# Patient Record
Sex: FEMALE | Race: WHITE | NOT HISPANIC OR LATINO | Employment: UNEMPLOYED | URBAN - METROPOLITAN AREA
[De-identification: names, ages, dates, MRNs, and addresses within clinical notes are randomized per-mention and may not be internally consistent; named-entity substitution may affect disease eponyms.]

---

## 2019-06-12 DIAGNOSIS — M79.672 LEFT FOOT PAIN: Primary | ICD-10-CM

## 2019-06-13 ENCOUNTER — OFFICE VISIT (OUTPATIENT)
Dept: ORTHOPEDICS | Facility: CLINIC | Age: 59
End: 2019-06-13
Payer: COMMERCIAL

## 2019-06-13 ENCOUNTER — HOSPITAL ENCOUNTER (OUTPATIENT)
Dept: RADIOLOGY | Facility: HOSPITAL | Age: 59
Discharge: HOME | End: 2019-06-13
Attending: ORTHOPAEDIC SURGERY
Payer: COMMERCIAL

## 2019-06-13 VITALS
HEIGHT: 66 IN | WEIGHT: 250 LBS | BODY MASS INDEX: 40.18 KG/M2 | HEART RATE: 70 BPM | DIASTOLIC BLOOD PRESSURE: 58 MMHG | SYSTOLIC BLOOD PRESSURE: 115 MMHG

## 2019-06-13 DIAGNOSIS — M76.62 ACHILLES TENDINITIS OF LEFT LOWER EXTREMITY: ICD-10-CM

## 2019-06-13 DIAGNOSIS — M92.62 HAGLUND'S DEFORMITY OF LEFT HEEL: Primary | ICD-10-CM

## 2019-06-13 DIAGNOSIS — M79.672 LEFT FOOT PAIN: ICD-10-CM

## 2019-06-13 PROCEDURE — 99203 OFFICE O/P NEW LOW 30 MIN: CPT | Mod: S$GLB,,, | Performed by: ORTHOPAEDIC SURGERY

## 2019-06-13 PROCEDURE — 97760 ORTHOTIC MGMT&TRAING 1ST ENC: CPT | Mod: S$GLB,,, | Performed by: ORTHOPAEDIC SURGERY

## 2019-06-13 PROCEDURE — 73630 XR FOOT COMPLETE 3 VIEW LEFT: ICD-10-PCS | Mod: 26,LT,, | Performed by: RADIOLOGY

## 2019-06-13 PROCEDURE — 99999 PR PBB SHADOW E&M-EST. PATIENT-LVL III: CPT | Mod: PBBFAC,,, | Performed by: ORTHOPAEDIC SURGERY

## 2019-06-13 PROCEDURE — 99203 PR OFFICE/OUTPT VISIT, NEW, LEVL III, 30-44 MIN: ICD-10-PCS | Mod: S$GLB,,, | Performed by: ORTHOPAEDIC SURGERY

## 2019-06-13 PROCEDURE — 99999 PR PBB SHADOW E&M-EST. PATIENT-LVL III: ICD-10-PCS | Mod: PBBFAC,,, | Performed by: ORTHOPAEDIC SURGERY

## 2019-06-13 PROCEDURE — 97760 PR ORTHOTIC MGMT&TRAINJ INITIAL ENC EA 15 MINS: ICD-10-PCS | Mod: S$GLB,,, | Performed by: ORTHOPAEDIC SURGERY

## 2019-06-13 PROCEDURE — 3008F BODY MASS INDEX DOCD: CPT | Mod: CPTII,S$GLB,, | Performed by: ORTHOPAEDIC SURGERY

## 2019-06-13 PROCEDURE — 3008F PR BODY MASS INDEX (BMI) DOCUMENTED: ICD-10-PCS | Mod: CPTII,S$GLB,, | Performed by: ORTHOPAEDIC SURGERY

## 2019-06-13 PROCEDURE — 73630 X-RAY EXAM OF FOOT: CPT | Mod: 26,LT,, | Performed by: RADIOLOGY

## 2019-06-13 PROCEDURE — 73630 X-RAY EXAM OF FOOT: CPT | Mod: TC,PO,LT

## 2019-06-13 RX ORDER — SPIRONOLACTONE 25 MG/1
1 TABLET ORAL DAILY
COMMUNITY
Start: 2019-04-24

## 2019-06-13 RX ORDER — ACETAMINOPHEN AND PHENYLEPHRINE HCL 325; 5 MG/1; MG/1
5000 TABLET ORAL DAILY
COMMUNITY
Start: 2019-02-06

## 2019-06-13 RX ORDER — LEVOTHYROXINE SODIUM 100 UG/1
1 TABLET ORAL DAILY
COMMUNITY
Start: 2019-04-18

## 2019-06-13 RX ORDER — VALACYCLOVIR HYDROCHLORIDE 1 G/1
1000 TABLET, FILM COATED ORAL 2 TIMES DAILY PRN
Refills: 3 | COMMUNITY
Start: 2019-05-08

## 2019-06-13 RX ORDER — FAMOTIDINE 20 MG/1
20 TABLET, FILM COATED ORAL 2 TIMES DAILY
COMMUNITY
Start: 2019-02-06 | End: 2020-02-06

## 2019-06-13 RX ORDER — FUROSEMIDE 20 MG/1
1 TABLET ORAL DAILY PRN
COMMUNITY
Start: 2019-04-15

## 2019-06-13 RX ORDER — METRONIDAZOLE 10 MG/G
1 GEL TOPICAL DAILY PRN
COMMUNITY

## 2019-06-13 NOTE — PROGRESS NOTES
"HPI: Raquel Parada is a 54 y.o. female who is for c/o left achilles pain. She had the same problem on the right that I treated her for about 5 years ago.   She rates her pain as 7/10 today. She is having issues with her kidneys and peripheral edema. The pain is worse with walking and standing.  No history of injury or trauma.         PAST MEDICAL/SURGICAL/FAMILY/SOCIAL/ HISTORY: REVIEWED    ALLERGIES/MEDICATIONS: REVIEWED      PHYSICAL EXAM:      Filed Vitals:     10/23/14 1326   BP: 126/60   Pulse: 56          Ht Readings from Last 1 Encounters:   10/23/14 5' 6" (1.676 m)          Wt Readings from Last 1 Encounters:   10/23/14 250 lb (113.399 kg)            GENERAL: Well developed, well nourished, no acute distress.  SKIN: Skin is intact. No atrophy, abrasions or lesions are noted.   Neurological: Normal mental status. Appropriate and conversant. Alert and oriented x 3.  GAIT: Walks with non-antalgic gait.     Right lower extremity:  2+ dorsalis pedis pulse.  Capillary refill < 3 seconds.  Normal range of motion tibiotalar and subtalar joint  HPI: Raquel Parada is a 54 y.o. female who is here for f/u on her right insertional achilles tendonitis.  She rates her pain as 0/10 today. She still has one small spot that is tender.  The PT and the boot helped a lot.        PAST MEDICAL/SURGICAL/FAMILY/SOCIAL/ HISTORY: REVIEWED    ALLERGIES/MEDICATIONS: REVIEWED      PHYSICAL EXAM:      Filed Vitals:     10/23/14 1326   BP: 126/60   Pulse: 56          Ht Readings from Last 1 Encounters:   10/23/14 5' 6" (1.676 m)          Wt Readings from Last 1 Encounters:   10/23/14 250 lb (113.399 kg)            GENERAL: Well developed, well nourished, no acute distress.  SKIN: Skin is intact. No atrophy, abrasions or lesions are noted.   Neurological: Normal mental status. Appropriate and conversant. Alert and oriented x 3.  GAIT: Walks with a mildly antalgic gait.     Left lower extremity compared with RLE:  2+ dorsalis pedis " pulse.  Capillary refill < 3 seconds.  Normal range of motion tibiotalar and subtalar joints. Mild pes planovalgus.   5/5 strength EHL, FHL, tibialis anterior, gastrocsoleus, tibialis posterior and peroneals. Sensation to light touch intact sural, saphenous, superficial peroneal and deep peroneal nerves. No swelling at the insertion of the achilles. No lymphadenopathy, no masses or tumors palpated.    tenderness to palpation at the insertion of the achilles tendon where she does not have a pump bump.  severe 2+ pitting edema bilateral lower extremities L>R.       XRAYS:   3 views of left ankle obtained and reviewed today reveal mild spurring at the insertion of the achilles tendon.  Mild Haylee's deformity.       ASSESSMENT:           Encounter Diagnoses   Name Primary?    Haylee's deformity of left heel Yes    Achilles tendinitis of left lower extremity          PLAN: She says she will do her home  PT exercises. CPT code 26572:  We performed a custom orthotic/brace adjustment, fitting and training with the patient today. The patient demonstrated understanding and proper care. This was performed for 15 minutes.  Short boot  was given.  We discussed that do to her kidney issues and severe peripheral edema I do not think any type of surgery for this is a good idea.  F/u prn.

## 2019-11-30 ENCOUNTER — CLINICAL SUPPORT (OUTPATIENT)
Dept: CARDIOLOGY | Facility: HOSPITAL | Age: 59
End: 2019-11-30
Attending: INTERNAL MEDICINE
Payer: COMMERCIAL

## 2019-11-30 ENCOUNTER — HOSPITAL ENCOUNTER (OUTPATIENT)
Facility: HOSPITAL | Age: 59
Discharge: HOME | End: 2019-12-01
Attending: EMERGENCY MEDICINE | Admitting: INTERNAL MEDICINE
Payer: COMMERCIAL

## 2019-11-30 DIAGNOSIS — R07.89 ATYPICAL CHEST PAIN: Primary | ICD-10-CM

## 2019-11-30 DIAGNOSIS — R07.9 CHEST PAIN: ICD-10-CM

## 2019-11-30 DIAGNOSIS — R10.13 EPIGASTRIC PAIN: ICD-10-CM

## 2019-11-30 PROBLEM — I10 HTN (HYPERTENSION): Status: ACTIVE | Noted: 2019-11-30

## 2019-11-30 PROBLEM — N05.0 MINIMAL CHANGE DISEASE: Status: ACTIVE | Noted: 2019-11-30

## 2019-11-30 PROBLEM — R01.1 CARDIAC MURMUR: Status: ACTIVE | Noted: 2019-11-30

## 2019-11-30 LAB
ALBUMIN SERPL BCP-MCNC: 3.4 G/DL (ref 3.5–5.2)
ALP SERPL-CCNC: 64 U/L (ref 55–135)
ALT SERPL W/O P-5'-P-CCNC: 20 U/L (ref 10–44)
ANION GAP SERPL CALC-SCNC: 9 MMOL/L (ref 8–16)
AST SERPL-CCNC: 18 U/L (ref 10–40)
BASOPHILS # BLD AUTO: 0.04 K/UL (ref 0–0.2)
BASOPHILS NFR BLD: 0.5 % (ref 0–1.9)
BILIRUB SERPL-MCNC: 0.9 MG/DL (ref 0.1–1)
BNP SERPL-MCNC: 14 PG/ML (ref 0–99)
BUN SERPL-MCNC: 15 MG/DL (ref 6–20)
CALCIUM SERPL-MCNC: 9.9 MG/DL (ref 8.7–10.5)
CHLORIDE SERPL-SCNC: 102 MMOL/L (ref 95–110)
CK MB SERPL-MCNC: 1.3 NG/ML (ref 0.1–6.5)
CO2 SERPL-SCNC: 27 MMOL/L (ref 23–29)
CREAT SERPL-MCNC: 0.8 MG/DL (ref 0.5–1.4)
D DIMER PPP IA.FEU-MCNC: 0.52 UG/ML FEU
DIFFERENTIAL METHOD: ABNORMAL
EOSINOPHIL # BLD AUTO: 0.1 K/UL (ref 0–0.5)
EOSINOPHIL NFR BLD: 1.7 % (ref 0–8)
ERYTHROCYTE [DISTWIDTH] IN BLOOD BY AUTOMATED COUNT: 12.6 % (ref 11.5–14.5)
EST. GFR  (AFRICAN AMERICAN): >60 ML/MIN/1.73 M^2
EST. GFR  (NON AFRICAN AMERICAN): >60 ML/MIN/1.73 M^2
GLUCOSE SERPL-MCNC: 119 MG/DL (ref 70–110)
HCT VFR BLD AUTO: 40.5 % (ref 37–48.5)
HGB BLD-MCNC: 13.4 G/DL (ref 12–16)
IMM GRANULOCYTES # BLD AUTO: 0.02 K/UL (ref 0–0.04)
IMM GRANULOCYTES NFR BLD AUTO: 0.2 % (ref 0–0.5)
LYMPHOCYTES # BLD AUTO: 1 K/UL (ref 1–4.8)
LYMPHOCYTES NFR BLD: 12.4 % (ref 18–48)
MCH RBC QN AUTO: 31.3 PG (ref 27–31)
MCHC RBC AUTO-ENTMCNC: 33.1 G/DL (ref 32–36)
MCV RBC AUTO: 95 FL (ref 82–98)
MONOCYTES # BLD AUTO: 0.6 K/UL (ref 0.3–1)
MONOCYTES NFR BLD: 7.1 % (ref 4–15)
NEUTROPHILS # BLD AUTO: 6.3 K/UL (ref 1.8–7.7)
NEUTROPHILS NFR BLD: 78.1 % (ref 38–73)
NRBC BLD-RTO: 0 /100 WBC
PLATELET # BLD AUTO: 148 K/UL (ref 150–350)
PMV BLD AUTO: 12.9 FL (ref 9.2–12.9)
POTASSIUM SERPL-SCNC: 4 MMOL/L (ref 3.5–5.1)
PROT SERPL-MCNC: 6.7 G/DL (ref 6–8.4)
RBC # BLD AUTO: 4.28 M/UL (ref 4–5.4)
SODIUM SERPL-SCNC: 138 MMOL/L (ref 136–145)
TROPONIN I SERPL DL<=0.01 NG/ML-MCNC: <0.03 NG/ML (ref 0.02–0.04)
WBC # BLD AUTO: 8.12 K/UL (ref 3.9–12.7)

## 2019-11-30 PROCEDURE — 96372 THER/PROPH/DIAG INJ SC/IM: CPT | Mod: 59

## 2019-11-30 PROCEDURE — G0378 HOSPITAL OBSERVATION PER HR: HCPCS

## 2019-11-30 PROCEDURE — 85379 FIBRIN DEGRADATION QUANT: CPT

## 2019-11-30 PROCEDURE — 94761 N-INVAS EAR/PLS OXIMETRY MLT: CPT

## 2019-11-30 PROCEDURE — 25000003 PHARM REV CODE 250: Performed by: EMERGENCY MEDICINE

## 2019-11-30 PROCEDURE — 99285 EMERGENCY DEPT VISIT HI MDM: CPT | Mod: 25

## 2019-11-30 PROCEDURE — 83880 ASSAY OF NATRIURETIC PEPTIDE: CPT

## 2019-11-30 PROCEDURE — 63600175 PHARM REV CODE 636 W HCPCS: Performed by: EMERGENCY MEDICINE

## 2019-11-30 PROCEDURE — 25000003 PHARM REV CODE 250: Performed by: INTERNAL MEDICINE

## 2019-11-30 PROCEDURE — 84484 ASSAY OF TROPONIN QUANT: CPT | Mod: 91

## 2019-11-30 PROCEDURE — 63600175 PHARM REV CODE 636 W HCPCS: Performed by: INTERNAL MEDICINE

## 2019-11-30 PROCEDURE — 25500020 PHARM REV CODE 255: Performed by: EMERGENCY MEDICINE

## 2019-11-30 PROCEDURE — 93005 ELECTROCARDIOGRAM TRACING: CPT

## 2019-11-30 PROCEDURE — 80053 COMPREHEN METABOLIC PANEL: CPT

## 2019-11-30 PROCEDURE — 85025 COMPLETE CBC W/AUTO DIFF WBC: CPT

## 2019-11-30 PROCEDURE — 36415 COLL VENOUS BLD VENIPUNCTURE: CPT

## 2019-11-30 PROCEDURE — 82553 CREATINE MB FRACTION: CPT

## 2019-11-30 PROCEDURE — 96375 TX/PRO/DX INJ NEW DRUG ADDON: CPT | Mod: 59

## 2019-11-30 PROCEDURE — 93306 TTE W/DOPPLER COMPLETE: CPT

## 2019-11-30 RX ORDER — MORPHINE SULFATE 4 MG/ML
4 INJECTION, SOLUTION INTRAMUSCULAR; INTRAVENOUS
Status: COMPLETED | OUTPATIENT
Start: 2019-11-30 | End: 2019-11-30

## 2019-11-30 RX ORDER — HYDROCODONE BITARTRATE AND ACETAMINOPHEN 5; 325 MG/1; MG/1
1 TABLET ORAL EVERY 6 HOURS PRN
Status: DISCONTINUED | OUTPATIENT
Start: 2019-11-30 | End: 2019-12-01 | Stop reason: HOSPADM

## 2019-11-30 RX ORDER — PANTOPRAZOLE SODIUM 40 MG/1
40 TABLET, DELAYED RELEASE ORAL DAILY
Status: DISCONTINUED | OUTPATIENT
Start: 2019-11-30 | End: 2019-12-01 | Stop reason: HOSPADM

## 2019-11-30 RX ORDER — TALC
9 POWDER (GRAM) TOPICAL NIGHTLY PRN
Status: DISCONTINUED | OUTPATIENT
Start: 2019-11-30 | End: 2019-12-01 | Stop reason: HOSPADM

## 2019-11-30 RX ORDER — CHOLECALCIFEROL (VITAMIN D3) 25 MCG
5000 TABLET ORAL DAILY
Status: DISCONTINUED | OUTPATIENT
Start: 2019-12-01 | End: 2019-12-01 | Stop reason: HOSPADM

## 2019-11-30 RX ORDER — TALC
8 POWDER (GRAM) TOPICAL NIGHTLY PRN
Status: DISCONTINUED | OUTPATIENT
Start: 2019-11-30 | End: 2019-11-30

## 2019-11-30 RX ORDER — FAMOTIDINE 20 MG/1
20 TABLET, FILM COATED ORAL 2 TIMES DAILY
Status: DISCONTINUED | OUTPATIENT
Start: 2019-11-30 | End: 2019-12-01 | Stop reason: HOSPADM

## 2019-11-30 RX ORDER — HYDROMORPHONE HYDROCHLORIDE 1 MG/ML
0.5 INJECTION, SOLUTION INTRAMUSCULAR; INTRAVENOUS; SUBCUTANEOUS
Status: COMPLETED | OUTPATIENT
Start: 2019-11-30 | End: 2019-11-30

## 2019-11-30 RX ORDER — ACETAMINOPHEN 325 MG/1
650 TABLET ORAL EVERY 8 HOURS PRN
Status: DISCONTINUED | OUTPATIENT
Start: 2019-11-30 | End: 2019-12-01 | Stop reason: HOSPADM

## 2019-11-30 RX ORDER — ASPIRIN 325 MG
325 TABLET ORAL
Status: COMPLETED | OUTPATIENT
Start: 2019-11-30 | End: 2019-11-30

## 2019-11-30 RX ORDER — IBUPROFEN 200 MG
16 TABLET ORAL
Status: DISCONTINUED | OUTPATIENT
Start: 2019-11-30 | End: 2019-12-01 | Stop reason: HOSPADM

## 2019-11-30 RX ORDER — IBUPROFEN 200 MG
24 TABLET ORAL
Status: DISCONTINUED | OUTPATIENT
Start: 2019-11-30 | End: 2019-12-01 | Stop reason: HOSPADM

## 2019-11-30 RX ORDER — MYCOPHENOLATE MOFETIL 250 MG/1
500 CAPSULE ORAL 2 TIMES DAILY
Status: DISCONTINUED | OUTPATIENT
Start: 2019-11-30 | End: 2019-12-01 | Stop reason: HOSPADM

## 2019-11-30 RX ORDER — CHOLECALCIFEROL (VITAMIN D3) 50 MCG
5000 TABLET ORAL DAILY
Status: DISCONTINUED | OUTPATIENT
Start: 2019-11-30 | End: 2019-11-30

## 2019-11-30 RX ORDER — ONDANSETRON 2 MG/ML
4 INJECTION INTRAMUSCULAR; INTRAVENOUS
Status: COMPLETED | OUTPATIENT
Start: 2019-11-30 | End: 2019-11-30

## 2019-11-30 RX ORDER — NITROGLYCERIN 0.4 MG/1
0.4 TABLET SUBLINGUAL EVERY 5 MIN PRN
Status: DISCONTINUED | OUTPATIENT
Start: 2019-11-30 | End: 2019-12-01 | Stop reason: HOSPADM

## 2019-11-30 RX ORDER — LEVOTHYROXINE SODIUM 100 UG/1
100 TABLET ORAL
Status: DISCONTINUED | OUTPATIENT
Start: 2019-11-30 | End: 2019-12-01 | Stop reason: HOSPADM

## 2019-11-30 RX ORDER — ONDANSETRON 2 MG/ML
4 INJECTION INTRAMUSCULAR; INTRAVENOUS EVERY 8 HOURS PRN
Status: DISCONTINUED | OUTPATIENT
Start: 2019-11-30 | End: 2019-12-01 | Stop reason: HOSPADM

## 2019-11-30 RX ORDER — SODIUM CHLORIDE 0.9 % (FLUSH) 0.9 %
10 SYRINGE (ML) INJECTION
Status: DISCONTINUED | OUTPATIENT
Start: 2019-11-30 | End: 2019-12-01 | Stop reason: HOSPADM

## 2019-11-30 RX ORDER — ACETAMINOPHEN 325 MG/1
650 TABLET ORAL EVERY 4 HOURS PRN
Status: DISCONTINUED | OUTPATIENT
Start: 2019-11-30 | End: 2019-12-01 | Stop reason: HOSPADM

## 2019-11-30 RX ORDER — SODIUM CHLORIDE 9 MG/ML
INJECTION, SOLUTION INTRAVENOUS CONTINUOUS
Status: DISCONTINUED | OUTPATIENT
Start: 2019-11-30 | End: 2019-12-01 | Stop reason: HOSPADM

## 2019-11-30 RX ORDER — LOSARTAN POTASSIUM 50 MG/1
50 TABLET ORAL DAILY
Status: DISCONTINUED | OUTPATIENT
Start: 2019-11-30 | End: 2019-11-30

## 2019-11-30 RX ORDER — ENOXAPARIN SODIUM 100 MG/ML
40 INJECTION SUBCUTANEOUS EVERY 24 HOURS
Status: DISCONTINUED | OUTPATIENT
Start: 2019-11-30 | End: 2019-12-01 | Stop reason: HOSPADM

## 2019-11-30 RX ORDER — SPIRONOLACTONE 25 MG/1
25 TABLET ORAL DAILY
Status: DISCONTINUED | OUTPATIENT
Start: 2019-11-30 | End: 2019-12-01 | Stop reason: HOSPADM

## 2019-11-30 RX ORDER — POLYETHYLENE GLYCOL 3350 17 G/17G
17 POWDER, FOR SOLUTION ORAL DAILY
Status: DISCONTINUED | OUTPATIENT
Start: 2019-11-30 | End: 2019-12-01 | Stop reason: HOSPADM

## 2019-11-30 RX ADMIN — HYDROMORPHONE HYDROCHLORIDE 0.5 MG: 1 INJECTION, SOLUTION INTRAMUSCULAR; INTRAVENOUS; SUBCUTANEOUS at 11:11

## 2019-11-30 RX ADMIN — ENOXAPARIN SODIUM 40 MG: 100 INJECTION SUBCUTANEOUS at 05:11

## 2019-11-30 RX ADMIN — SPIRONOLACTONE 25 MG: 25 TABLET ORAL at 03:11

## 2019-11-30 RX ADMIN — PANTOPRAZOLE SODIUM 40 MG: 40 TABLET, DELAYED RELEASE ORAL at 03:11

## 2019-11-30 RX ADMIN — ALUMINUM HYDROXIDE, MAGNESIUM HYDROXIDE, AND SIMETHICONE 50 ML: 200; 200; 20 SUSPENSION ORAL at 08:11

## 2019-11-30 RX ADMIN — IOHEXOL 100 ML: 350 INJECTION, SOLUTION INTRAVENOUS at 10:11

## 2019-11-30 RX ADMIN — ONDANSETRON 4 MG: 2 INJECTION INTRAMUSCULAR; INTRAVENOUS at 07:11

## 2019-11-30 RX ADMIN — FAMOTIDINE 20 MG: 20 TABLET ORAL at 08:11

## 2019-11-30 RX ADMIN — LEVOTHYROXINE SODIUM 100 MCG: 100 TABLET ORAL at 03:11

## 2019-11-30 RX ADMIN — ASPIRIN 325 MG ORAL TABLET 325 MG: 325 PILL ORAL at 07:11

## 2019-11-30 RX ADMIN — NITROGLYCERIN 0.4 MG: 0.4 TABLET, ORALLY DISINTEGRATING SUBLINGUAL at 07:11

## 2019-11-30 RX ADMIN — LOSARTAN POTASSIUM 75 MG: 25 TABLET, FILM COATED ORAL at 03:11

## 2019-11-30 RX ADMIN — SODIUM CHLORIDE: 0.9 INJECTION, SOLUTION INTRAVENOUS at 03:11

## 2019-11-30 RX ADMIN — POLYETHYLENE GLYCOL 3350 17 G: 17 POWDER, FOR SOLUTION ORAL at 03:11

## 2019-11-30 RX ADMIN — MORPHINE SULFATE 4 MG: 4 INJECTION INTRAVENOUS at 08:11

## 2019-11-30 RX ADMIN — MYCOPHENOLATE MOFETIL 500 MG: 250 CAPSULE ORAL at 08:11

## 2019-11-30 NOTE — SUBJECTIVE & OBJECTIVE
Past Medical History:   Diagnosis Date    DVT (deep venous thrombosis)     Immune deficiency disorder     Kidney disease     Minimal change disease        Past Surgical History:   Procedure Laterality Date    THYROIDECTOMY         Review of patient's allergies indicates:   Allergen Reactions    Ace inhibitors      Other reaction(s): severe abdomal pain    Latex Rash       No current facility-administered medications on file prior to encounter.      Current Outpatient Medications on File Prior to Encounter   Medication Sig    cholecalciferol, vitamin D3, 5,000 unit TbDL Take 5,000 Units by mouth once daily.    famotidine (PEPCID) 20 MG tablet Take 20 mg by mouth 2 (two) times daily.    levothyroxine (SYNTHROID) 100 MCG tablet Take 1 tablet by mouth once daily.    losartan (COZAAR) 25 MG tablet Take 50 mg by mouth once daily.     mycophenolate (CELLCEPT) 500 mg Tab Take 500 mg by mouth 2 (two) times daily.     spironolactone (ALDACTONE) 25 MG tablet Take 1 tablet by mouth once daily.    furosemide (LASIX) 20 MG tablet Take 1 tablet by mouth daily as needed.    metronidazole 1% (METROGEL) 1 % Gel Apply 1 application topically daily as needed.    valACYclovir (VALTREX) 1000 MG tablet Take 1,000 mg by mouth 2 (two) times daily as needed.     Family History     Problem Relation (Age of Onset)    Cancer Mother    Heart attack Father        Tobacco Use    Smoking status: Never Smoker    Smokeless tobacco: Never Used   Substance and Sexual Activity    Alcohol use: Yes     Comment: Occasionally    Drug use: No    Sexual activity: Not on file     Review of Systems   Constitutional: Positive for activity change (Due to decreased physical tolerance) and fatigue.   HENT: Negative.    Eyes: Negative.    Respiratory: Negative.    Cardiovascular: Positive for chest pain ( central, substernal, lower chest). Negative for palpitations and leg swelling.   Gastrointestinal: Positive for abdominal pain ( epigastric  discomfort) and nausea.   Genitourinary:        Minimal change disease   Musculoskeletal: Positive for arthralgias ( large joint shoulders and hips) and myalgias (?  Fibromyalgia).   Skin: Negative.    Neurological: Negative.    Psychiatric/Behavioral: Negative.      Objective:     Vital Signs (Most Recent):  Temp: 97.7 °F (36.5 °C) (11/30/19 0632)  Pulse: 60 (11/30/19 1230)  Resp: (!) 26 (11/30/19 1230)  BP: (!) 160/74 (11/30/19 1230)  SpO2: 96 % (11/30/19 1230) Vital Signs (24h Range):  Temp:  [97.7 °F (36.5 °C)] 97.7 °F (36.5 °C)  Pulse:  [42-60] 60  Resp:  [14-27] 26  SpO2:  [89 %-100 %] 96 %  BP: (118-170)/(56-79) 160/74     Weight: 108.9 kg (240 lb)  Body mass index is 38.74 kg/m².    Physical Exam   Constitutional: She is oriented to person, place, and time. She appears well-developed and well-nourished. No distress.   Morbidly obese   HENT:   Head: Normocephalic and atraumatic.   Eyes: Pupils are equal, round, and reactive to light. Conjunctivae and EOM are normal. Right eye exhibits no discharge. Left eye exhibits no discharge.   Neck: Normal range of motion. Neck supple.   Large neck circumference   Cardiovascular: Normal rate, regular rhythm and intact distal pulses. Exam reveals no gallop and no friction rub.   Murmur (2/6 systolic murmur) heard.  Tenderness over the lower sternum/xiphisternum   Pulmonary/Chest: Effort normal and breath sounds normal.   Abdominal: Soft. Bowel sounds are normal. There is no tenderness ( no tenderness in epigastrium/right upper quadrant).   Obese   Musculoskeletal: Normal range of motion. She exhibits no edema or deformity.   Neurological: She is alert and oriented to person, place, and time. No cranial nerve deficit.   Skin: Skin is warm and dry. No rash noted.   Psychiatric: She has a normal mood and affect. Her behavior is normal. Judgment and thought content normal.   Nursing note and vitals reviewed.        CRANIAL NERVES     CN III, IV, VI   Pupils are equal, round,  and reactive to light.  Extraocular motions are normal.        Significant Labs:   A1C: No results for input(s): HGBA1C in the last 4320 hours.  CBC:   Recent Labs   Lab 11/30/19  0658   WBC 8.12   HGB 13.4   HCT 40.5   *     CMP:   Recent Labs   Lab 11/30/19  0726      K 4.0      CO2 27   *   BUN 15   CREATININE 0.8   CALCIUM 9.9   PROT 6.7   ALBUMIN 3.4*   BILITOT 0.9   ALKPHOS 64   AST 18   ALT 20   ANIONGAP 9   EGFRNONAA >60.0     Cardiac Markers:   Recent Labs   Lab 11/30/19  0658   BNP 14     Lipid Panel: No results for input(s): CHOL, HDL, LDLCALC, TRIG, CHOLHDL in the last 48 hours.  Magnesium: No results for input(s): MG in the last 48 hours.  Troponin:   Recent Labs   Lab 11/30/19 0726 11/30/19  1020   TROPONINI <0.030 <0.030     TSH: No results for input(s): TSH in the last 4320 hours.  Urine Studies: No results for input(s): COLORU, APPEARANCEUA, PHUR, SPECGRAV, PROTEINUA, GLUCUA, KETONESU, BILIRUBINUA, OCCULTUA, NITRITE, UROBILINOGEN, LEUKOCYTESUR, RBCUA, WBCUA, BACTERIA, SQUAMEPITHEL, HYALINECASTS in the last 48 hours.    Invalid input(s): WRIGHTSUR    Significant Imaging: I have reviewed all pertinent imaging results/findings within the past 24 hours.     CTA Chest Non-Coronary - PE Study   Final Result      1. Negative for pulmonary embolus.   2. Coronary artery calcifications.   3. Incidental 7-8 mm lingular pulmonary nodule.  CT thorax without IV contrast follow-up is recommended within 6-12 months with additional CT follow-up again at 18-24 months, to document stability and exclude possibility of early pulmonary malignancy.         Electronically signed by: Eric Marquez MD   Date:    11/30/2019   Time:    11:09      X-Ray Chest AP Portable   Final Result      No acute cardiopulmonary abnormality.         Electronically signed by: Eric Marquez MD   Date:    11/30/2019   Time:    07:30      US Abdomen Limited    (Results Pending)

## 2019-11-30 NOTE — HPI
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  Patient informed the pain does not radiate to any of her shoulders are arm but does radiate to her epigastric region.  Denies  vomiting or diarrhea recently but reported associated nausea.  Patient reports she woke up around 1 in the morning as her dog woke her up but she did not felt right.  Patient denies any recent weight gain or weight loss.  Patient does not see a cardiologist.  Does report a positive family history of coronary artery disease with her father had CABG 2 weeks ago  Patient informed she is on immune suppressive medicine mycophenolate for her minimal change disease. Patient is under care of Dr. Juárez in [place], reports she takes mycophenolate plus losartan plus spironolactone as a concoction for her minimal change, states when she was initially diagnosed she was on high dose steroid but she has been off since she is on maintenance medication  In the ER patient initial EKG and troponins were negative.  Patient did receive nitrates with no relief.  Also received GI cocktail and morphine IV with no relief.  Patient did receive Dilaudid which slightly improved the pain.  Patient does report decreased physical endurance and stamina  Patient did mention that her nephrologist wanted to see a rheumatologist for fibromyalgia/polymyalgia rheumatica as her pain is mostly in shoulder, upper arm, hip girdle and upper thighs (large joints and large muscles), patient reports she has not established yet

## 2019-11-30 NOTE — ED NOTES
Pt states she is receiving no relief from the nitro ordered.  Pt able to tolerate 2 but her BP is too low for a 3rd.  MD notified of this.

## 2019-11-30 NOTE — ASSESSMENT & PLAN NOTE
Ordered echocardiogram  Also noted on CT angio coronary artery calcification  Ordered lipid profile for a.m.  Patient will benefit from outpatient cardiology appointment/cardiac risk factor assessment

## 2019-11-30 NOTE — ASSESSMENT & PLAN NOTE
?  Gastritis  Continue famotidine b.i.d.  Start pantoprazole 40 mg q.a.m.  Consult GI  Follow-up right upper quadrant ultrasound

## 2019-11-30 NOTE — ED PROVIDER NOTES
Encounter Date: 11/30/2019       History     Chief Complaint   Patient presents with    Chest Pain     c/o chest pressure that began at 1 am.    Denies SOB, but reports mild nausea.   Pain does not radiate     59-year-old female who has some history of chronic kidney disease and who has had a prior thyroidectomy, presents emergency room with complaints of having chest pain that began approximately 1:00 a.m. this morning.  The patient describes her chest pain is midsternal and pressure-like in nature.  There is no radiation to the pain.  She had nausea without vomiting.  No shortness of breath. She did admit becoming diaphoretic.  There has been no associated abdominal pain. No dysphagia or pleuritic component to her pain.  Patient admits that she has had some poor exercise tolerance recently.  No history of any diabetes or hypertension or any history of hyperlipidemia.  She has a positive family history for heart disease in that her father had coronary disease with a CABG.  Mother had lung cancer.        Review of patient's allergies indicates:   Allergen Reactions    Ace inhibitors      Other reaction(s): severe abdomal pain    Latex Rash     Past Medical History:   Diagnosis Date    DVT (deep venous thrombosis)     Immune deficiency disorder     Kidney disease      History reviewed. No pertinent surgical history.  Family History   Problem Relation Age of Onset    Cancer Mother     Heart attack Father      Social History     Tobacco Use    Smoking status: Never Smoker    Smokeless tobacco: Never Used   Substance Use Topics    Alcohol use: No    Drug use: No     Review of Systems   Constitutional: Negative for appetite change, chills, diaphoresis and fever.   HENT: Negative for congestion, rhinorrhea, sinus pressure, sore throat and trouble swallowing.    Eyes: Negative.    Respiratory: Positive for chest tightness. Negative for shortness of breath and wheezing.    Cardiovascular: Positive for chest  pain. Negative for palpitations.   Gastrointestinal: Positive for nausea. Negative for abdominal distention, abdominal pain and vomiting.   Genitourinary: Negative.  Negative for dysuria.   Musculoskeletal: Negative for back pain.   Skin: Negative for rash.   Neurological: Negative for weakness and headaches.   Hematological: Does not bruise/bleed easily.   All other systems reviewed and are negative.      Physical Exam     Initial Vitals [11/30/19 0632]   BP Pulse Resp Temp SpO2   (!) 170/79 (!) 48 16 97.7 °F (36.5 °C) 100 %      MAP       --         Physical Exam    Constitutional: She appears well-developed and well-nourished. She is not diaphoretic. No distress.   HENT:   Head: Normocephalic and atraumatic.   Nose: Nose normal.   Mouth/Throat: Oropharynx is clear and moist. No oropharyngeal exudate.   Eyes: Conjunctivae are normal. Pupils are equal, round, and reactive to light. Right eye exhibits no discharge. Left eye exhibits no discharge.   Neck: Normal range of motion. Neck supple. No thyromegaly present. No JVD present.   Cardiovascular: Normal rate, regular rhythm, normal heart sounds and intact distal pulses. Exam reveals no gallop and no friction rub.    No murmur heard.  Pulmonary/Chest: Breath sounds normal. No respiratory distress. She has no wheezes. She has no rhonchi. She has no rales. She exhibits no tenderness.   Abdominal: Soft. Bowel sounds are normal. She exhibits no distension. There is no tenderness. There is no rebound and no guarding.   Musculoskeletal: Normal range of motion. She exhibits no edema or tenderness.   Neurological: She is alert and oriented to person, place, and time. She has normal strength. No cranial nerve deficit or sensory deficit. GCS score is 15. GCS eye subscore is 4. GCS verbal subscore is 5. GCS motor subscore is 6.   Skin: Skin is warm and dry. Capillary refill takes less than 2 seconds. No rash noted. No erythema. No pallor.   Psychiatric: She has a normal mood  and affect. Her behavior is normal. Judgment and thought content normal.         ED Course   Procedures  Labs Reviewed   CBC W/ AUTO DIFFERENTIAL   COMPREHENSIVE METABOLIC PANEL   TROPONIN I   B-TYPE NATRIURETIC PEPTIDE   D DIMER, QUANTITATIVE        ECG Results          EKG 12-lead (In process)  Result time 11/30/19 06:53:08    In process by Interface, Lab In [facility] (11/30/19 06:53:08)                 Narrative:    Test Reason : R07.9,    Vent. Rate : 045 BPM     Atrial Rate : 045 BPM     P-R Int : 184 ms          QRS Dur : 098 ms      QT Int : 428 ms       P-R-T Axes : 030 -22 016 degrees     QTc Int : 370 ms    Sinus bradycardia  Low voltage QRS  Borderline Abnormal ECG  When compared with ECG of 26-JUL-2011 14:46,  Vent. rate has decreased BY  38 BPM  T wave inversion now evident in Anterior leads  QT has shortened    Referred By: [email or web address]   SELF           Confirmed By:                             Imaging Results    None                       Attending Attestation:             Attending ED Notes:   This 59-year-old female who presented with complaints of chest pain that began at 1:00 a.m. this morning has at this time a normal EKG but of bradycardia rate of 45.  The patient is not reportedly on beta-blockers.  She has had no history of any coronary disease but she does as mention some family history of such.  The workup at this time shows an EKG with no evidence of any acute injury or ischemia.  The patient's labs including troponin, CBC and CMP all negative for any acute abnormalities.  During the ED course the patient was given aspirin Zofran as well as topical nitrates.  There was no relief from that she was tried on a GI cocktail in light of her complaints than of burning sensation.  That to did not give relief.  She subsequently received IV doses of opiates including 4 mg of morphine and later 0.5 mg of Dilaudid.  A D-dimer was mildly elevated the patient a CT scan which did not show evidence of pulmonary  embolus.  Hospital Medicine is consulted and Dr. Hebert will be evaluating the patient for admission.                        Clinical Impression:       ICD-10-CM ICD-9-CM   1. Atypical chest pain R07.89 786.59   2. Chest pain R07.9 786.50                             Sheldon Oliver Jr., MD  11/30/19 1158

## 2019-11-30 NOTE — H&P
[facility] Medicine  History & Physical    Patient Name: Raquel Parada  MRN: [id number]  Admission Date: 11/30/2019  Attending Physician: Vipin Hebert MD   Primary Care Provider: Danette Workman MD         Patient information was obtained from patient and ER records.   Patient personally discussed with ER physician  Subjective:     Principal Problem:Atypical chest pain    Chief Complaint:   Chief Complaint   Patient presents with    Chest Pain     c/o chest pressure that began at 1 am.    Denies SOB, but reports mild nausea.   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  Patient informed the pain does not radiate to any of her shoulders are arm but does radiate to her epigastric region.  Denies  vomiting or diarrhea recently but reported associated nausea.  Patient reports she woke up around 1 in the morning as her dog woke her up but she did not felt right.  Patient denies any recent weight gain or weight loss.  Patient does not see a cardiologist.  Does report a positive family history of coronary artery disease with her father had CABG 2 weeks ago  Patient informed she is on immune suppressive medicine mycophenolate for her minimal change disease. Patient is under care of Dr. Juárez in [place], reports she takes mycophenolate plus losartan plus spironolactone as a concoction for her minimal change, states when she was initially diagnosed she was on high dose steroid but she has been off since she is on maintenance medication  In the ER patient initial EKG and troponins were negative.  Patient did receive nitrates with no relief.  Also received GI cocktail and morphine IV with no relief.   Patient did receive Dilaudid which slightly improved the pain.  Patient does report decreased physical endurance and stamina  Patient did mention that her nephrologist wanted to see a rheumatologist for fibromyalgia/polymyalgia rheumatica as her pain is mostly in shoulder, upper arm, hip girdle and upper thighs (large joints and large muscles), patient reports she has not established yet        Past Medical History:   Diagnosis Date    DVT (deep venous thrombosis)     Immune deficiency disorder     Kidney disease     Minimal change disease        Past Surgical History:   Procedure Laterality Date    THYROIDECTOMY         Review of patient's allergies indicates:   Allergen Reactions    Ace inhibitors      Other reaction(s): severe abdomal pain    Latex Rash       No current facility-administered medications on file prior to encounter.      Current Outpatient Medications on File Prior to Encounter   Medication Sig    cholecalciferol, vitamin D3, 5,000 unit TbDL Take 5,000 Units by mouth once daily.    famotidine (PEPCID) 20 MG tablet Take 20 mg by mouth 2 (two) times daily.    levothyroxine (SYNTHROID) 100 MCG tablet Take 1 tablet by mouth once daily.    losartan (COZAAR) 25 MG tablet Take 50 mg by mouth once daily.     mycophenolate (CELLCEPT) 500 mg Tab Take 500 mg by mouth 2 (two) times daily.     spironolactone (ALDACTONE) 25 MG tablet Take 1 tablet by mouth once daily.    furosemide (LASIX) 20 MG tablet Take 1 tablet by mouth daily as needed.    metronidazole 1% (METROGEL) 1 % Gel Apply 1 application topically daily as needed.    valACYclovir (VALTREX) 1000 MG tablet Take 1,000 mg by mouth 2 (two) times daily as needed.     Family History     Problem Relation (Age of Onset)    Cancer Mother    Heart attack Father        Tobacco Use    Smoking status: Never Smoker    Smokeless tobacco: Never Used   Substance and Sexual Activity    Alcohol use: Yes     Comment: Occasionally    Drug use: No     Sexual activity: Not on file     Review of Systems   Constitutional: Positive for activity change (Due to decreased physical tolerance) and fatigue.   HENT: Negative.    Eyes: Negative.    Respiratory: Negative.    Cardiovascular: Positive for chest pain ( central, substernal, lower chest). Negative for palpitations and leg swelling.   Gastrointestinal: Positive for abdominal pain ( epigastric discomfort) and nausea.   Genitourinary:        Minimal change disease   Musculoskeletal: Positive for arthralgias ( large joint shoulders and hips) and myalgias (?  Fibromyalgia).   Skin: Negative.    Neurological: Negative.    Psychiatric/Behavioral: Negative.      Objective:     Vital Signs (Most Recent):  Temp: 97.7 °F (36.5 °C) (11/30/19 0632)  Pulse: 60 (11/30/19 1230)  Resp: (!) 26 (11/30/19 1230)  BP: (!) 160/74 (11/30/19 1230)  SpO2: 96 % (11/30/19 1230) Vital Signs (24h Range):  Temp:  [97.7 °F (36.5 °C)] 97.7 °F (36.5 °C)  Pulse:  [42-60] 60  Resp:  [14-27] 26  SpO2:  [89 %-100 %] 96 %  BP: (118-170)/(56-79) 160/74     Weight: 108.9 kg (240 lb)  Body mass index is 38.74 kg/m².    Physical Exam   Constitutional: She is oriented to person, place, and time. She appears well-developed and well-nourished. No distress.   Morbidly obese   HENT:   Head: Normocephalic and atraumatic.   Eyes: Pupils are equal, round, and reactive to light. Conjunctivae and EOM are normal. Right eye exhibits no discharge. Left eye exhibits no discharge.   Neck: Normal range of motion. Neck supple.   Large neck circumference   Cardiovascular: Normal rate, regular rhythm and intact distal pulses. Exam reveals no gallop and no friction rub.   Murmur (2/6 systolic murmur) heard.  Tenderness over the lower sternum/xiphisternum   Pulmonary/Chest: Effort normal and breath sounds normal.   Abdominal: Soft. Bowel sounds are normal. There is no tenderness ( no tenderness in epigastrium/right upper quadrant).   Obese   Musculoskeletal: Normal range of  motion. She exhibits no edema or deformity.   Neurological: She is alert and oriented to person, place, and time. No cranial nerve deficit.   Skin: Skin is warm and dry. No rash noted.   Psychiatric: She has a normal mood and affect. Her behavior is normal. Judgment and thought content normal.   Nursing note and vitals reviewed.        CRANIAL NERVES     CN III, IV, VI   Pupils are equal, round, and reactive to light.  Extraocular motions are normal.        Significant Labs:   A1C: No results for input(s): HGBA1C in the last 4320 hours.  CBC:   Recent Labs   Lab 11/30/19  0658   WBC 8.12   HGB 13.4   HCT 40.5   *     CMP:   Recent Labs   Lab 11/30/19  0726      K 4.0      CO2 27   *   BUN 15   CREATININE 0.8   CALCIUM 9.9   PROT 6.7   ALBUMIN 3.4*   BILITOT 0.9   ALKPHOS 64   AST 18   ALT 20   ANIONGAP 9   EGFRNONAA >60.0     Cardiac Markers:   Recent Labs   Lab 11/30/19  0658   BNP 14     Lipid Panel: No results for input(s): CHOL, HDL, LDLCALC, TRIG, CHOLHDL in the last 48 hours.  Magnesium: No results for input(s): MG in the last 48 hours.  Troponin:   Recent Labs   Lab 11/30/19  0726 11/30/19  1020   TROPONINI <0.030 <0.030     TSH: No results for input(s): TSH in the last 4320 hours.  Urine Studies: No results for input(s): COLORU, APPEARANCEUA, PHUR, SPECGRAV, PROTEINUA, GLUCUA, KETONESU, BILIRUBINUA, OCCULTUA, NITRITE, UROBILINOGEN, LEUKOCYTESUR, RBCUA, WBCUA, BACTERIA, SQUAMEPITHEL, HYALINECASTS in the last 48 hours.    Invalid input(s): WRIGHTSUR    Significant Imaging: I have reviewed all pertinent imaging results/findings within the past 24 hours.     CTA Chest Non-Coronary - PE Study   Final Result      1. Negative for pulmonary embolus.   2. Coronary artery calcifications.   3. Incidental 7-8 mm lingular pulmonary nodule.  CT thorax without IV contrast follow-up is recommended within 6-12 months with additional CT follow-up again at 18-24 months, to document stability and  exclude possibility of early pulmonary malignancy.         Electronically signed by: Eric Marquez MD   Date:    11/30/2019   Time:    11:09      X-Ray Chest AP Portable   Final Result      No acute cardiopulmonary abnormality.         Electronically signed by: Eric Marquez MD   Date:    11/30/2019   Time:    07:30      US Abdomen Limited    (Results Pending)         Assessment/Plan:     * Atypical chest pain  Atypical chest pain, could be esophageal spasm versus esophagitis.  Patient is on mycophenolate/immunocompromised status- ?  Candida, hiatal hernia  Will rule out cardiac cause  D-dimer is slightly elevated at 0.52, CTA chest PE protocol showed negative for PE, coronary artery calcification.  Incidental finding of 7-8 mm lingular pulmonary nodule with recommended follow-up with CT scan without contrast in 6-12 months and then again at 18-24 months to document stability.  Atelectasis bilateral bases, no hilar lymphadenopathy noted  Troponins x2 are negative in the ER, ordered 3rd set of troponin with CK-MB at 4:00 p.m.  EKG reviewed, no acute changes  Positive for cardiac murmur, ordered echocardiogram  Due to the atypical nature of lower sternal/epigastric pain, I am ordering ultrasound right upper quadrant to rule out cholelithiasis  I will also consult Gastroenterology Dr. Valdes for evaluation/possible EGD  Nitroglycerin p.r.n. chest pain  Continue famotidine b.i.d., home med  Start pantoprazole 40 mg q.a.m.  NPO for now for right upper quadrant ultrasound and GI evaluation  Will start normal saline at 75 cc/hour for 1 L      Cardiac murmur  Ordered echocardiogram  Also noted on CT angio coronary artery calcification  Ordered lipid profile for a.m.  Patient will benefit from outpatient cardiology appointment/cardiac risk factor assessment      BMI 38.0-38.9,adult  Discussed low carb diet, weight loss      Epigastric pain  ?  Gastritis  Continue famotidine b.i.d.  Start pantoprazole 40 mg q.a.m.  Consult  GI  Follow-up right upper quadrant ultrasound      Minimal change disease  Patient is established with nephrologist Dr. Campuzano  On mycophenolate 500 mg b.i.d., losartan 75 mg daily and spironolactone 25 mg daily- continue home meds  Ordered UA          VTE Risk Mitigation (From admission, onward)         Ordered     enoxaparin injection 40 mg  Daily      11/30/19 3826                   Vipin Hebert MD  Department of Hospital Medicine   [facility]

## 2019-11-30 NOTE — ASSESSMENT & PLAN NOTE
Atypical chest pain, could be esophageal spasm versus esophagitis.  Patient is on mycophenolate/immunocompromised status- ?  Candida, hiatal hernia  Will rule out cardiac cause  D-dimer is slightly elevated at 0.52, CTA chest PE protocol showed negative for PE, coronary artery calcification.  Incidental finding of 7-8 mm lingular pulmonary nodule with recommended follow-up with CT scan without contrast in 6-12 months and then again at 18-24 months to document stability.  Atelectasis bilateral bases, no hilar lymphadenopathy noted  Troponins x2 are negative in the ER, ordered 3rd set of troponin with CK-MB at 4:00 p.m.  EKG reviewed, no acute changes  Positive for cardiac murmur, ordered echocardiogram  Due to the atypical nature of lower sternal/epigastric pain, I am ordering ultrasound right upper quadrant to rule out cholelithiasis  I will also consult Gastroenterology Dr. Valdes for evaluation/possible EGD  Nitroglycerin p.r.n. chest pain  Continue famotidine b.i.d., home med  Start pantoprazole 40 mg q.a.m.  NPO for now for right upper quadrant ultrasound and GI evaluation  Will start normal saline at 75 cc/hour for 1 L

## 2019-11-30 NOTE — ASSESSMENT & PLAN NOTE
Patient is established with nephrologist Dr. Campuzano  On mycophenolate 500 mg b.i.d., losartan 75 mg daily and spironolactone 25 mg daily- continue home meds  Ordered UA

## 2019-12-01 VITALS
BODY MASS INDEX: 39.79 KG/M2 | DIASTOLIC BLOOD PRESSURE: 73 MMHG | RESPIRATION RATE: 16 BRPM | WEIGHT: 247.56 LBS | HEART RATE: 75 BPM | HEIGHT: 66 IN | OXYGEN SATURATION: 96 % | SYSTOLIC BLOOD PRESSURE: 144 MMHG | TEMPERATURE: 99 F

## 2019-12-01 LAB
ALBUMIN SERPL BCP-MCNC: 3.4 G/DL (ref 3.5–5.2)
ALP SERPL-CCNC: 61 U/L (ref 55–135)
ALT SERPL W/O P-5'-P-CCNC: 18 U/L (ref 10–44)
ANION GAP SERPL CALC-SCNC: 10 MMOL/L (ref 8–16)
AORTIC ROOT ANNULUS: 2.64 CM
AORTIC VALVE CUSP SEPERATION: 1.74 CM
AST SERPL-CCNC: 17 U/L (ref 10–40)
AV INDEX (PROSTH): 0.67
AV MEAN GRADIENT: 9 MMHG
AV PEAK GRADIENT: 17 MMHG
AV VALVE AREA: 1.96 CM2
AV VELOCITY RATIO: 77.27
BASOPHILS # BLD AUTO: 0.02 K/UL (ref 0–0.2)
BASOPHILS NFR BLD: 0.2 % (ref 0–1.9)
BILIRUB SERPL-MCNC: 1.6 MG/DL (ref 0.1–1)
BSA FOR ECHO PROCEDURE: 2.29 M2
BUN SERPL-MCNC: 10 MG/DL (ref 6–20)
CALCIUM SERPL-MCNC: 8.6 MG/DL (ref 8.7–10.5)
CHLORIDE SERPL-SCNC: 100 MMOL/L (ref 95–110)
CHOLEST SERPL-MCNC: 141 MG/DL (ref 120–199)
CHOLEST/HDLC SERPL: 3 {RATIO} (ref 2–5)
CO2 SERPL-SCNC: 25 MMOL/L (ref 23–29)
CREAT SERPL-MCNC: 0.7 MG/DL (ref 0.5–1.4)
CV ECHO LV RWT: 0.43 CM
DIFFERENTIAL METHOD: ABNORMAL
DOP CALC AO PEAK VEL: 2.08 M/S
DOP CALC AO VTI: 48.15 CM
DOP CALC LVOT AREA: 3 CM2
DOP CALC LVOT DIAMETER: 1.94 CM
DOP CALC LVOT PEAK VEL: 160.72 M/S
DOP CALC LVOT STROKE VOLUME: 94.6 CM3
DOP CALCLVOT PEAK VEL VTI: 32.02 CM
E WAVE DECELERATION TIME: 215.48 MSEC
E/A RATIO: 1.24
E/E' RATIO: 10.78 M/S
ECHO LV POSTERIOR WALL: 1.17 CM (ref 0.6–1.1)
EOSINOPHIL # BLD AUTO: 0 K/UL (ref 0–0.5)
EOSINOPHIL NFR BLD: 0.3 % (ref 0–8)
ERYTHROCYTE [DISTWIDTH] IN BLOOD BY AUTOMATED COUNT: 12.7 % (ref 11.5–14.5)
EST. GFR  (AFRICAN AMERICAN): >60 ML/MIN/1.73 M^2
EST. GFR  (NON AFRICAN AMERICAN): >60 ML/MIN/1.73 M^2
ESTIMATED AVG GLUCOSE: 111 MG/DL (ref 68–131)
FRACTIONAL SHORTENING: 26 % (ref 28–44)
GLUCOSE SERPL-MCNC: 121 MG/DL (ref 70–110)
HBA1C MFR BLD HPLC: 5.5 % (ref 4.5–6.2)
HCT VFR BLD AUTO: 37.9 % (ref 37–48.5)
HDLC SERPL-MCNC: 47 MG/DL (ref 40–75)
HDLC SERPL: 33.3 % (ref 20–50)
HGB BLD-MCNC: 12.8 G/DL (ref 12–16)
IMM GRANULOCYTES # BLD AUTO: 0.05 K/UL (ref 0–0.04)
IMM GRANULOCYTES NFR BLD AUTO: 0.5 % (ref 0–0.5)
INTERVENTRICULAR SEPTUM: 1.17 CM (ref 0.6–1.1)
LDLC SERPL CALC-MCNC: 85.2 MG/DL (ref 63–159)
LEFT ATRIUM SIZE: 3.63 CM
LEFT INTERNAL DIMENSION IN SYSTOLE: 4.04 CM (ref 2.1–4)
LEFT VENTRICLE DIASTOLIC VOLUME INDEX: 39.91 ML/M2
LEFT VENTRICLE DIASTOLIC VOLUME: 87.4 ML
LEFT VENTRICLE MASS INDEX: 119 G/M2
LEFT VENTRICLE SYSTOLIC VOLUME INDEX: 16.7 ML/M2
LEFT VENTRICLE SYSTOLIC VOLUME: 36.58 ML
LEFT VENTRICULAR INTERNAL DIMENSION IN DIASTOLE: 5.48 CM (ref 3.5–6)
LEFT VENTRICULAR MASS: 261.55 G
LV LATERAL E/E' RATIO: 8.82 M/S
LV SEPTAL E/E' RATIO: 13.86 M/S
LYMPHOCYTES # BLD AUTO: 1.2 K/UL (ref 1–4.8)
LYMPHOCYTES NFR BLD: 11.3 % (ref 18–48)
MAGNESIUM SERPL-MCNC: 1.4 MG/DL (ref 1.6–2.6)
MCH RBC QN AUTO: 31.4 PG (ref 27–31)
MCHC RBC AUTO-ENTMCNC: 33.8 G/DL (ref 32–36)
MCV RBC AUTO: 93 FL (ref 82–98)
MONOCYTES # BLD AUTO: 0.9 K/UL (ref 0.3–1)
MONOCYTES NFR BLD: 9.1 % (ref 4–15)
MV PEAK A VEL: 0.78 M/S
MV PEAK E VEL: 0.97 M/S
NEUTROPHILS # BLD AUTO: 8.1 K/UL (ref 1.8–7.7)
NEUTROPHILS NFR BLD: 78.6 % (ref 38–73)
NONHDLC SERPL-MCNC: 94 MG/DL
NRBC BLD-RTO: 0 /100 WBC
PHOSPHATE SERPL-MCNC: 3.9 MG/DL (ref 2.7–4.5)
PISA TR MAX VEL: 2.68 M/S
PLATELET # BLD AUTO: 233 K/UL (ref 150–350)
PMV BLD AUTO: 11.9 FL (ref 9.2–12.9)
POTASSIUM SERPL-SCNC: 4 MMOL/L (ref 3.5–5.1)
PROT SERPL-MCNC: 6.8 G/DL (ref 6–8.4)
PV PEAK VELOCITY: 129.56 CM/S
RA PRESSURE: 3 MMHG
RBC # BLD AUTO: 4.07 M/UL (ref 4–5.4)
SODIUM SERPL-SCNC: 135 MMOL/L (ref 136–145)
TDI LATERAL: 0.11 M/S
TDI SEPTAL: 0.07 M/S
TDI: 0.09 M/S
TR MAX PG: 29 MMHG
TRIGL SERPL-MCNC: 44 MG/DL (ref 30–150)
TSH SERPL DL<=0.005 MIU/L-ACNC: 0.4 UIU/ML (ref 0.34–5.6)
TV REST PULMONARY ARTERY PRESSURE: 32 MMHG
WBC # BLD AUTO: 10.31 K/UL (ref 3.9–12.7)

## 2019-12-01 PROCEDURE — 85025 COMPLETE CBC W/AUTO DIFF WBC: CPT

## 2019-12-01 PROCEDURE — 36415 COLL VENOUS BLD VENIPUNCTURE: CPT

## 2019-12-01 PROCEDURE — 96365 THER/PROPH/DIAG IV INF INIT: CPT

## 2019-12-01 PROCEDURE — 80053 COMPREHEN METABOLIC PANEL: CPT

## 2019-12-01 PROCEDURE — 83735 ASSAY OF MAGNESIUM: CPT

## 2019-12-01 PROCEDURE — 94761 N-INVAS EAR/PLS OXIMETRY MLT: CPT

## 2019-12-01 PROCEDURE — 96366 THER/PROPH/DIAG IV INF ADDON: CPT

## 2019-12-01 PROCEDURE — G0378 HOSPITAL OBSERVATION PER HR: HCPCS

## 2019-12-01 PROCEDURE — 63600175 PHARM REV CODE 636 W HCPCS: Performed by: HOSPITALIST

## 2019-12-01 PROCEDURE — 63600175 PHARM REV CODE 636 W HCPCS: Performed by: INTERNAL MEDICINE

## 2019-12-01 PROCEDURE — 84443 ASSAY THYROID STIM HORMONE: CPT

## 2019-12-01 PROCEDURE — 25000003 PHARM REV CODE 250: Performed by: INTERNAL MEDICINE

## 2019-12-01 PROCEDURE — 96361 HYDRATE IV INFUSION ADD-ON: CPT

## 2019-12-01 PROCEDURE — 80061 LIPID PANEL: CPT

## 2019-12-01 PROCEDURE — 84100 ASSAY OF PHOSPHORUS: CPT

## 2019-12-01 PROCEDURE — 83036 HEMOGLOBIN GLYCOSYLATED A1C: CPT

## 2019-12-01 RX ORDER — MAGNESIUM SULFATE HEPTAHYDRATE 40 MG/ML
2 INJECTION, SOLUTION INTRAVENOUS ONCE
Status: COMPLETED | OUTPATIENT
Start: 2019-12-01 | End: 2019-12-01

## 2019-12-01 RX ORDER — MAGNESIUM SULFATE 1 G/100ML
1 INJECTION INTRAVENOUS ONCE
Status: DISCONTINUED | OUTPATIENT
Start: 2019-12-01 | End: 2019-12-01

## 2019-12-01 RX ORDER — PANTOPRAZOLE SODIUM 20 MG/1
20 TABLET, DELAYED RELEASE ORAL DAILY
Qty: 30 TABLET | Refills: 11 | Status: SHIPPED | OUTPATIENT
Start: 2019-12-01 | End: 2020-11-30

## 2019-12-01 RX ADMIN — Medication 5000 UNITS: at 12:12

## 2019-12-01 RX ADMIN — MYCOPHENOLATE MOFETIL 500 MG: 250 CAPSULE ORAL at 12:12

## 2019-12-01 RX ADMIN — LOSARTAN POTASSIUM 75 MG: 25 TABLET, FILM COATED ORAL at 12:12

## 2019-12-01 RX ADMIN — LEVOTHYROXINE SODIUM 100 MCG: 100 TABLET ORAL at 05:12

## 2019-12-01 RX ADMIN — SODIUM CHLORIDE: 0.9 INJECTION, SOLUTION INTRAVENOUS at 05:12

## 2019-12-01 RX ADMIN — POLYETHYLENE GLYCOL 3350 17 G: 17 POWDER, FOR SOLUTION ORAL at 12:12

## 2019-12-01 RX ADMIN — SPIRONOLACTONE 25 MG: 25 TABLET ORAL at 12:12

## 2019-12-01 RX ADMIN — FAMOTIDINE 20 MG: 20 TABLET ORAL at 12:12

## 2019-12-01 RX ADMIN — MAGNESIUM SULFATE 2 G: 2 INJECTION INTRAVENOUS at 06:12

## 2019-12-01 RX ADMIN — PANTOPRAZOLE SODIUM 40 MG: 40 TABLET, DELAYED RELEASE ORAL at 12:12

## 2019-12-01 NOTE — DISCHARGE SUMMARY
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  Patient informed the pain does not radiate to any of her shoulders are arm but does radiate to her epigastric region.  Denies  vomiting or diarrhea recently but reported associated nausea.  Patient reports she woke up around 1 in the morning as her dog woke her up but she did not felt right.  Patient denies any recent weight gain or weight loss.  Patient does not see a cardiologist.  Does report a positive family history of coronary artery disease with her father had CABG 2 weeks ago  Patient informed she is on immune suppressive medicine mycophenolate for her minimal change disease. Patient is under care of Dr. Juárez in [place], reports she takes mycophenolate plus losartan plus spironolactone as a concoction for her minimal change, states when she was initially diagnosed she was on high dose steroid but she has been off since she is on maintenance medication  In the ER patient initial EKG and troponins were negative.  Patient did receive nitrates with no relief.  Also received GI cocktail and morphine IV with no relief.  Patient did receive Dilaudid which slightly improved the pain.  Patient does report decreased physical endurance and stamina  Patient did mention that her nephrologist wanted to see a rheumatologist for  "fibromyalgia/polymyalgia rheumatica as her pain is mostly in shoulder, upper arm, hip girdle and upper thighs (large joints and large muscles), patient reports she has not established yet        * No surgery found *      Hospital Course:   Patient was admitted for epigastric pain. Overnight her pain responded to Protonix.  Gastroenterology was consulted for possible endoscopy.  However since patient was asymptomatic it was deferred. Her pain appeared consistent with reflux disease.  Patient is going to be discharged home on Protonix 20 mg daily, and she is going to follow up with the gastroenterologist in 2 weeks time.    Discharge Instructions  The patient was discharged in the care of her parents//wife/family/caregiver, with discharge instructions were reviewed in written and verbal form. All pertinent questions were discussed and prescriptions were provided. The patient will follow up in 1 week or sooner as needed with the PCP, and the patient is on board with the plan.    Physical Exam  BP (!) 117/57 (BP Location: Right arm, Patient Position: Sitting)   Pulse 72   Temp 98.7 °F (37.1 °C) (Oral)   Resp 16   Ht 5' 6" (1.676 m)   Wt 112.3 kg (247 lb 9.2 oz)   SpO2 97%   Breastfeeding? No   BMI 39.96 kg/m²   Vitals reviewed.    Constitutional: No distress.   HENT: Atraumatic.   Cardiovascular: Normal rate, regular rhythm and normal heart sounds.   Pulmonary/Chest: Effort normal. Clear to auscultation bilaterally. No wheezes.   Abdominal: Soft. Bowel sounds are normal. Exhibits no distension and no mass. No tenderness  Neurological: Alert.   Skin: Skin is warm and dry.     Following labs were Reviewed   Recent Labs   Lab 12/01/19  0444   WBC 10.31   HGB 12.8   HCT 37.9      CALCIUM 8.6*   ALBUMIN 3.4*   PROT 6.8   *   K 4.0   CO2 25      BUN 10   CREATININE 0.7   ALKPHOS 61   ALT 18   AST 17   BILITOT 1.6*     No results found for: POCTGLUCOSE     All labs within the past 24 hours " have been reviewed    Microbiology Results (last 7 days)     ** No results found for the last 168 hours. **        US Abdomen Limited   Final Result      1. Cholelithiasis and or sludge in the gallbladder, without other sonographic evidence of acute cholecystitis.   2. Diffuse hepatic steatosis.         Electronically signed by: Js Forrest MD   Date:    11/30/2019   Time:    13:41      CTA Chest Non-Coronary - PE Study   Final Result      1. Negative for pulmonary embolus.   2. Coronary artery calcifications.   3. Incidental 7-8 mm lingular pulmonary nodule.  CT thorax without IV contrast follow-up is recommended within 6-12 months with additional CT follow-up again at 18-24 months, to document stability and exclude possibility of early pulmonary malignancy.         Electronically signed by: Eric Marquez MD   Date:    11/30/2019   Time:    11:09      X-Ray Chest AP Portable   Final Result      No acute cardiopulmonary abnormality.         Electronically signed by: Eric Marquez MD   Date:    11/30/2019   Time:    07:30          Consults:   Consults (From admission, onward)        Status Ordering Provider     Inpatient consult to Gastroenterology  Once     Provider:  Delvin Valdes III, MD    Acknowledged VIPIN HART     Inpatient consult to Hospitalist  Once     Provider:  Vipin Hart MD    Acknowledged VIPIN HART          No new Assessment & Plan notes have been filed under this hospital service since the last note was generated.  Service: Hospital Medicine    Final Active Diagnoses:    Diagnosis Date Noted POA    PRINCIPAL PROBLEM:  Atypical chest pain [R07.89] 11/30/2019 Yes    Minimal change disease [N05.0] 11/30/2019 Yes    Epigastric pain [R10.13] 11/30/2019 Yes    BMI 38.0-38.9,adult [Z68.38] 11/30/2019 Not Applicable    Cardiac murmur [R01.1] 11/30/2019 Yes      Problems Resolved During this Admission:       Discharged Condition: good    Disposition:     Follow Up:  Follow-up Information      Danette Workman MD In 1 week.    Specialty:  Nephrology  Contact information:  [address] [id number] [phone]             Delvin Valdes III, MD.    Specialty:  Gastroenterology  Contact information:  [address] [facility] [address]  [phone]                 Patient Instructions:      Diet Adult Regular     Notify your health care provider if you experience any of the following:  temperature >100.4     Notify your health care provider if you experience any of the following:  persistent nausea and vomiting or diarrhea     Notify your health care provider if you experience any of the following:  severe uncontrolled pain     Notify your health care provider if you experience any of the following:  redness, tenderness, or signs of infection (pain, swelling, redness, odor or green/yellow discharge around incision site)     Notify your health care provider if you experience any of the following:  difficulty breathing or increased cough     Notify your health care provider if you experience any of the following:  severe persistent headache     Notify your health care provider if you experience any of the following:  worsening rash     Notify your health care provider if you experience any of the following:  persistent dizziness, light-headedness, or visual disturbances     Notify your health care provider if you experience any of the following:  increased confusion or weakness     No dressing needed     Activity as tolerated       Significant Diagnostic Studies: Labs:   BMP:   Recent Labs   Lab 11/30/19  0726 12/01/19  0444   * 121*    135*   K 4.0 4.0    100   CO2 27 25   BUN 15 10   CREATININE 0.8 0.7   CALCIUM 9.9 8.6*   MG  --  1.4*       Pending Diagnostic Studies:     Procedure Component Value Units Date/Time    Echo Color Flow Doppler? Yes; Bubble Contrast? No [[id number]]  (Abnormal) Resulted:  11/30/19 1532    Order Status:  Sent Lab Status:   In process Updated:  11/30/19 1534     BSA 2.29 m2      TDI SEPTAL 0.07 m/s      LV LATERAL E/E' RATIO 8.82 m/s      LV SEPTAL E/E' RATIO 13.86 m/s      AORTIC VALVE CUSP SEPERATION 1.74 cm      TDI LATERAL 0.11 m/s      PV PEAK VELOCITY 129.56 cm/s      LVIDD 5.48 cm      IVS 1.17 cm      PW 1.17 cm      Ao root annulus 2.64 cm      LVIDS 4.04 cm      FS 26 %      LV mass 261.55 g      LA size 3.63 cm      Left Ventricle Relative Wall Thickness 0.43 cm      AV mean gradient 9 mmHg      AV valve area 1.96 cm2      AV Velocity Ratio 77.27     AV index (prosthetic) 0.67     E/A ratio 1.24     Mean e' 0.09 m/s      E wave decelartion time 215.48 msec      LVOT diameter 1.94 cm      LVOT area 3.0 cm2      LVOT peak dagoberto 160.72 m/s      LVOT peak VTI 32.02 cm      Ao peak dagoberto 2.08 m/s      Ao VTI 48.15 cm      LVOT stroke volume 94.60 cm3      AV peak gradient 17 mmHg      E/E' ratio 10.78 m/s      MV Peak E Dagoberto 0.97 m/s      TR Max Dagoberto 2.68 m/s      MV Peak A Dagoberto 0.78 m/s      LV Systolic Volume 36.58 mL      LV Systolic Volume Index 16.7 mL/m2      LV Diastolic Volume 87.40 mL      LV Diastolic Volume Index 39.91 mL/m2      LV Mass Index 119 g/m2      Triscuspid Valve Regurgitation Peak Gradient 29 mmHg     Narrative:       · Concentric left ventricular hypertrophy.       Impression:                Medications:  Reconciled Home Medications:      Medication List      START taking these medications    pantoprazole 20 MG tablet  Commonly known as:  PROTONIX  Take 1 tablet (20 mg total) by mouth once daily.        CONTINUE taking these medications    cholecalciferol (vitamin D3) 5,000 unit Tbdl  Take 5,000 Units by mouth once daily.     furosemide 20 MG tablet  Commonly known as:  LASIX  Take 1 tablet by mouth daily as needed.     levothyroxine 100 MCG tablet  Commonly known as:  SYNTHROID  Take 1 tablet by mouth once daily.     losartan 25 MG tablet  Commonly known as:  COZAAR  Take 75 mg by mouth once daily.      metronidazole 1% 1 % Gel  Commonly known as:  METROGEL  Apply 1 application topically daily as needed.     mycophenolate 500 mg Tab  Commonly known as:  CELLCEPT  Take 500 mg by mouth 2 (two) times daily.     spironolactone 25 MG tablet  Commonly known as:  ALDACTONE  Take 1 tablet by mouth once daily.     valACYclovir 1000 MG tablet  Commonly known as:  VALTREX  Take 1,000 mg by mouth 2 (two) times daily as needed.        ASK your doctor about these medications    famotidine 20 MG tablet  Commonly known as:  PEPCID  Take 20 mg by mouth 2 (two) times daily.            Indwelling Lines/Drains at time of discharge:   Lines/Drains/Airways     None                 Time spent on the discharge of patient: 30 minutes  Patient was seen and examined on the date of discharge and determined to be suitable for discharge.         Yumiko Akhtar MD  Department of Hospital Medicine  [facility]

## 2019-12-01 NOTE — CONSULTS
GASTROENTEROLOGY INPATIENT CONSULT NOTE  Patient Name: Raquel Parada  Patient MRN: [id number]  Patient : 1960    Admit Date: 2019  Service date: 2019    Reason for Consult: dyspespia / chest pain    PCP: Danette Workman MD    Chief Complaint   Patient presents with    Chest Pain     c/o chest pressure that began at 1 am.    Denies SOB, but reports mild nausea.   Pain does not radiate       HPI: Patient is a 59 y.o. 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 Acute onset, constant, progressive but subsided since admission. No bleeding, dysphagia, RUQ pain or previous EGD. Endorses GERD despite H2A    CHART REVIEW:   ABd u/s  - Nml panc; GB sludge w/ 5mm CBD; Fatty liver  CTA  -  CAD; Neg for PE    Past Medical History:  Past Medical History:   Diagnosis Date    DVT (deep venous thrombosis)     Immune deficiency disorder     Kidney disease     Minimal change disease         Past Surgical History:  Past Surgical History:   Procedure Laterality Date    THYROIDECTOMY          Home Medications:  Medications Prior to Admission   Medication Sig Dispense Refill Last Dose    cholecalciferol, vitamin D3, 5,000 unit TbDL Take 5,000 Units by mouth once daily.   2019 at 08:00    famotidine (PEPCID) 20 MG tablet Take 20 mg by mouth 2 (two) times daily.   Past Month at 08:00    levothyroxine (SYNTHROID) 100 MCG tablet Take 1 tablet by mouth once daily.   2019 at 08;00    losartan (COZAAR) 25 MG tablet Take 75 mg by mouth once daily.    2019 at 08:00    mycophenolate (CELLCEPT) 500 mg Tab Take 500 mg by mouth 2 (two) times daily.    2019 at 20;00    spironolactone (ALDACTONE) 25 MG tablet Take 1 tablet by mouth once daily.   2019 at 08:00    furosemide (LASIX) 20 MG tablet Take 1 tablet by mouth daily as needed.   Taking     metronidazole 1% (METROGEL) 1 % Gel Apply 1 application topically daily as needed.   Taking    valACYclovir (VALTREX) 1000 MG tablet Take 1,000 mg by mouth 2 (two) times daily as needed.  3 Unknown at Unknown time       Inpatient Medications:   enoxaparin  40 mg Subcutaneous Daily    famotidine  20 mg Oral BID    levothyroxine  100 mcg Oral Before breakfast    losartan  75 mg Oral Daily    mycophenolate  500 mg Oral BID    pantoprazole  40 mg Oral Daily    polyethylene glycol  17 g Oral Daily    spironolactone  25 mg Oral Daily    vitamin D  5,000 Units Oral Daily     acetaminophen, acetaminophen, glucose, glucose, HYDROcodone-acetaminophen, melatonin, nitroGLYCERIN, ondansetron, promethazine (PHENERGAN) IVPB, sodium chloride 0.9%    Review of patient's allergies indicates:   Allergen Reactions    Ace inhibitors      Other reaction(s): severe abdomal pain    Latex Rash       Social History:   Social History     Occupational History    Not on file   Tobacco Use    Smoking status: Never Smoker    Smokeless tobacco: Never Used   Substance and Sexual Activity    Alcohol use: Yes     Comment: Occasionally wine    Drug use: No    Sexual activity: Not on file       Family History:   Family History   Problem Relation Age of Onset    Cancer Mother     Heart attack Father     No Known Problems Brother        Review of Systems:  A 10 point review of systems was performed and was normal, except as mentioned in the HPI, including constitutional, HEENT, heme, lymph, cardiovascular, respiratory, gastrointestinal, genitourinary, neurologic, endocrine, psychiatric and musculoskeletal.      OBJECTIVE:    Physical Exam:  24 Hour Vital Sign Ranges: Temp:  [97.8 °F (36.6 °C)-99.1 °F (37.3 °C)] 98.7 °F (37.1 °C)  Pulse:  [54-76] 72  Resp:  [16-27] 16  SpO2:  [93 %-99 %] 97 %  BP: (117-173)/(57-87) 117/57  Most recent vitals: BP (!) 117/57 (BP Location: Right arm, Patient Position: Sitting)   Pulse 72   Temp 98.7 °F  "(37.1 °C) (Oral)   Resp 16   Ht 5' 6" (1.676 m)   Wt 112.3 kg (247 lb 9.2 oz)   SpO2 97%   Breastfeeding? No   BMI 39.96 kg/m²    GEN: well-developed, well-nourished, awake and alert, non-toxic appearing adult  HEENT: PERRL, sclera anicteric, oral mucosa pink and moist without lesion  NECK: trachea midline; Good ROM  CV: regular rate and rhythm, no murmurs or gallops  RESP: clear to auscultation bilaterally, no wheezes, rhonci or rales  ABD: soft, non-tender, non-distended, normal bowel sounds, no hepatosplenomegaly, no hernias  EXT: no swelling or edema, 2+ pulses distally  SKIN: no rashes or jaundice  PSYCH: normal affect    Labs:   Recent Labs     11/30/19  0658 12/01/19  0444   WBC 8.12 10.31   MCV 95 93   * 233     Recent Labs     11/30/19  0726 12/01/19  0444    135*   K 4.0 4.0    100   CO2 27 25   BUN 15 10   * 121*     No results for input(s): ALB in the last 72 hours.    Invalid input(s): ALKP, SGOT, SGPT, TBIL, DBIL, TPRO  No results for input(s): PT, INR, PTT in the last 72 hours.      Radiology Review:  US Abdomen Limited   Final Result      1. Cholelithiasis and or sludge in the gallbladder, without other sonographic evidence of acute cholecystitis.   2. Diffuse hepatic steatosis.         Electronically signed by: Js Forrest MD   Date:    11/30/2019   Time:    13:41      CTA Chest Non-Coronary - PE Study   Final Result      1. Negative for pulmonary embolus.   2. Coronary artery calcifications.   3. Incidental 7-8 mm lingular pulmonary nodule.  CT thorax without IV contrast follow-up is recommended within 6-12 months with additional CT follow-up again at 18-24 months, to document stability and exclude possibility of early pulmonary malignancy.         Electronically signed by: Eric Marquez MD   Date:    11/30/2019   Time:    11:09      X-Ray Chest AP Portable   Final Result      No acute cardiopulmonary abnormality.         Electronically signed by: Eric Marquez MD "   Date:    11/30/2019   Time:    07:30            IMPRESSION / RECOMMENDATIONS:  59 y.o. 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  No alarm features and sx have resolved for now. Sx not typical for biliary despite GB sludge.     -Substitute low dose PPI for pepcid  -Revisit endoscopy if recurrence especially if occurs despite PPI      Thank you for this consult.    Delvin Valdes III  12/1/2019  10:41 AM

## 2019-12-01 NOTE — NURSING
Discharge instructions reviewed with pt. Questions answered. Copy of discharge instructions given to pt. Education on protonix given. Discharge home via ambulatory to vehicle. Accompanied by .